# Patient Record
Sex: MALE | Race: BLACK OR AFRICAN AMERICAN
[De-identification: names, ages, dates, MRNs, and addresses within clinical notes are randomized per-mention and may not be internally consistent; named-entity substitution may affect disease eponyms.]

---

## 2017-04-04 ENCOUNTER — HOSPITAL ENCOUNTER (EMERGENCY)
Dept: HOSPITAL 17 - NETRI | Age: 67
Discharge: HOME | End: 2017-04-04
Payer: MEDICARE

## 2017-04-04 VITALS
SYSTOLIC BLOOD PRESSURE: 140 MMHG | RESPIRATION RATE: 12 BRPM | HEART RATE: 105 BPM | DIASTOLIC BLOOD PRESSURE: 79 MMHG | OXYGEN SATURATION: 98 % | TEMPERATURE: 98.4 F

## 2017-04-04 VITALS — BODY MASS INDEX: 28.63 KG/M2 | HEIGHT: 73 IN | WEIGHT: 216.05 LBS

## 2017-04-04 DIAGNOSIS — M25.561: Primary | ICD-10-CM

## 2017-04-04 PROCEDURE — 99283 EMERGENCY DEPT VISIT LOW MDM: CPT

## 2017-04-04 PROCEDURE — E0113 CRUTCH UNDERARM EACH WOOD: HCPCS

## 2017-04-04 PROCEDURE — L1830 KO IMMOB CANVAS LONG PRE OTS: HCPCS

## 2017-04-04 NOTE — PD
HPI


Chief Complaint:  Injury


Time Seen by Provider:  13:46


Travel History


International Travel<30 days:  No


Contact w/Intl Traveler<30days:  No


Traveled to known affect area:  No





History of Present Illness


HPI


66-year-old male presents emergency Department with complaint of right knee 

pain.  Has history of chronic right knee pain and says he needs a total knee 

replacement.  Pain is exacerbated on Sunday.  He denies injury or strain.  Dr. Ramirez is his orthopedic surgeon and he has been trying to set up surgery for 

the last year.  Denies paresthesias, loss of sensation, decreased range of 

motion, decreased strength to the affected extremity.  These came for support 

with ambulation.  Pain is worse with ambulation, bending the knee.  Has tried 

taking tramadol with no relief of pain.  Denies fever, chills, nausea, 

vomiting.  Dr. White is primary care provider.  No known allergies.  Denies 

significant past medical history.  No other modifying factors or associated 

signs and symptoms.





PFSH


Social History


Tobacco Use:  No





Allergies-Medications


(Allergen,Severity, Reaction):  


Coded Allergies:  


     No Known Allergies (Unverified , 4/4/17)


Reported Meds & Prescriptions





Reported Meds & Active Scripts


Active


Folding Walker/5" Wheels (Device) 1 Mis Mis 1 Ea .ROUTE AS DIRECTED


Wheelchair (Device) 1 Mis Mis 1 Ea .ROUTE AS DIRECTED


Naproxen 500 Mg Tab 500 Mg PO BID 7 Days


Deltasone (Prednisone) 20 Mg Tab 40 Mg PO DAILY 5 Days


Reported


Omeprazole 20 Mg Tab 20 Mg PO DAILY


Mobic (Meloxicam) 7.5 Mg Tab 7.5 Mg PO DAILY PRN








Review of Systems


Except as stated in HPI:  all other systems reviewed are Neg





Physical Exam


Narrative


GENERAL: Well-nourished, well-developed  male patient, in no 

acute distress; afebrile, nontoxic-appearing


SKIN: Warm and dry. 


HEAD: Atraumatic. Normocephalic. 


EYES: Pupils equal and round. No scleral icterus. No injection or drainage.


ENT: Mucosa pink and moist.  Airway patent.  


NECK: Trachea midline.  


CARDIOVASCULAR: Regular rate.


RESPIRATORY: No accessory muscle use. 


GASTROINTESTINAL: Flat.


MUSCULOSKELETAL: Right knee is nonedematous, nonerythematous, and without 

ecchymosis; with full range of motion to flexion to 90; joint is stable with 

negative drawer test; no tenderness elicited on palpation to the anterior, 

medial, lateral aspect.  Right lower extremity is supple and non-tense with 2+ 

pedal pulses and sensory intact and without erythema or edema.  No obvious 

deformity.


NEUROLOGICAL: Awake and alert.  Oriented 3.  No obvious cranial nerve 

deficits.  Motor grossly within normal limits. Normal speech. 


PSYCHIATRIC: Appropriate mood and affect; insight and judgment normal.





Data


Data


Last Documented VS





Vital Signs








  Date Time  Temp Pulse Resp B/P Pulse Ox O2 Delivery O2 Flow Rate FiO2


 


4/4/17 12:46 98.4 105 12 140/79 98   








Orders








 Crutches (4/4/17 13:46)











MDM


Medical Decision Making


Medical Screen Exam Complete:  Yes


Emergency Medical Condition:  Yes


Medical Record Reviewed:  Yes


Differential Diagnosis


Arthritis, bursitis, knee pain


Narrative Course


66-year-old male with chronic knee pain with acute exacerbation since Sunday.  

Denies injury, fall, strain.  Right lower extremity supple and nontender 2+ 

pedal pulses and sensory intact without erythema or edema.  Heart rate recheck 

on physical exam at approximately 90 bpm.  Patient says he sees Dr. Ramirez and 

is supposed to be having a right total knee replacement.  He has history of 

left total knee replacement.  I do not suspect fracture, dislocation and feel 

imaging is not necessary at this time.  I did offer to do an x-ray and the 

patient declined at this time.  The patient has a cane for support.  He is 

requesting one crutch for support.  Crutches provided for support.  Naproxen, 

Deltasone, a wheelchair, and walker prescribed for home.  Instructed patient to 

follow up with Dr. Ramirez.  Patient verbalizes understanding and agreement with 

treatment plan.  Patient is medically cleared and stable for discharge.  

Discussed reasons to return to the emergency department.  Instructed patient to 

follow up with primary care provider.  Patient agrees with treatment plan.  The 

patients vital signs are stable and the patient is stable for outpatient follow-

up and treatment.  Patient discharged home, stable and in no acute distress.





Diagnosis





 Primary Impression:  


 Right knee pain


 Qualified Code:  M25.561 - Right knee pain, unspecified chronicity


Referrals:  


Orthopaedic Surgeon





Primary Care Physician


Patient Instructions:  Crutch Instructions (ED), General Instructions, Knee 

Pain (ED)





***Additional Instructions:


Tylenol or ibuprofen as needed and as directed to reduce pain and inflammation


Rest, ice, compress, and elevate extremity to decrease pain and inflammation


Knee Brace for support


Crutches, walker, cane, wheelchair for support


Avoid aggravating activity; increase activity as tolerated


Follow-up with primary care provider


Return to the emergency department immediately with worsening symptoms


***Med/Other Pt SpecificInfo:  Prescription(s) given


Scripts


Folding Walker/5" Wheels 1 Mis Mis #1 Ea .route As Directed 


   Prov:Mayuri Shukla         4/4/17 


Wheelchair 1 Mis Mis #1 EA .ROUTE AS DIRECTED   Ref 0


   Prov:Mayuri Shukla         4/4/17 


Naproxen 500 Mg Yoj911 Mg PO BID  7 Days  Ref 0


   Prov:Mayuri Shukla         4/4/17 


Prednisone (Deltasone)20 Mg Tab40 Mg PO DAILY  5 Days  Ref 0


   Prov:Mayuri Shukla         4/4/17


Disposition:  01 DISCHARGE HOME


Condition:  Stable








Mayuri Shukla Apr 4, 2017 13:50

## 2017-04-04 NOTE — PD
Physical Exam


Date Seen by Provider:  Apr 4, 2017


Time Seen by Provider:  12:58


Narrative


66 YOBM C/O  CHRONIC R KNEE PAIN NO INJURY





VITALS REVIEWED WAITING FOR BED PLACEMENT





Data


Data


Last Documented VS





Vital Signs








  Date Time  Temp Pulse Resp B/P Pulse Ox O2 Delivery O2 Flow Rate FiO2


 


4/4/17 12:46 98.4 105 12 140/79 98   











MDM


Medical Record Reviewed:  Yes


Supervised Visit with EVE:  Yes








Juan José Ingram Apr 4, 2017 12:59

## 2017-09-03 ENCOUNTER — HOSPITAL ENCOUNTER (OUTPATIENT)
Dept: HOSPITAL 17 - NEPE | Age: 67
Setting detail: OBSERVATION
Discharge: LEFT BEFORE DISCHARGE | End: 2017-09-03
Attending: FAMILY MEDICINE | Admitting: FAMILY MEDICINE
Payer: MEDICARE

## 2017-09-03 VITALS
SYSTOLIC BLOOD PRESSURE: 144 MMHG | HEART RATE: 92 BPM | RESPIRATION RATE: 18 BRPM | OXYGEN SATURATION: 99 % | DIASTOLIC BLOOD PRESSURE: 94 MMHG

## 2017-09-03 VITALS — HEART RATE: 92 BPM | SYSTOLIC BLOOD PRESSURE: 133 MMHG | DIASTOLIC BLOOD PRESSURE: 77 MMHG | OXYGEN SATURATION: 100 %

## 2017-09-03 VITALS — HEART RATE: 94 BPM | OXYGEN SATURATION: 99 %

## 2017-09-03 VITALS
HEART RATE: 98 BPM | RESPIRATION RATE: 16 BRPM | OXYGEN SATURATION: 100 % | DIASTOLIC BLOOD PRESSURE: 97 MMHG | TEMPERATURE: 98.8 F | SYSTOLIC BLOOD PRESSURE: 171 MMHG

## 2017-09-03 DIAGNOSIS — L03.116: Primary | ICD-10-CM

## 2017-09-03 DIAGNOSIS — Z96.652: ICD-10-CM

## 2017-09-03 LAB
ALP SERPL-CCNC: 87 U/L (ref 45–117)
ALT SERPL-CCNC: 24 U/L (ref 12–78)
ANION GAP SERPL CALC-SCNC: 9 MEQ/L (ref 5–15)
AST SERPL-CCNC: 18 U/L (ref 15–37)
BASOPHILS # BLD AUTO: 0.1 TH/MM3 (ref 0–0.2)
BASOPHILS NFR BLD: 1.3 % (ref 0–2)
BILIRUB SERPL-MCNC: 0.4 MG/DL (ref 0.2–1)
BUN SERPL-MCNC: 13 MG/DL (ref 7–18)
CHLORIDE SERPL-SCNC: 104 MEQ/L (ref 98–107)
EOSINOPHIL # BLD: 0.1 TH/MM3 (ref 0–0.4)
EOSINOPHIL NFR BLD: 2.6 % (ref 0–4)
ERYTHROCYTE [DISTWIDTH] IN BLOOD BY AUTOMATED COUNT: 14.7 % (ref 11.6–17.2)
GFR SERPLBLD BASED ON 1.73 SQ M-ARVRAT: 60 ML/MIN (ref 89–?)
HCO3 BLD-SCNC: 28 MEQ/L (ref 21–32)
HCT VFR BLD CALC: 41.1 % (ref 39–51)
HEMO FLAGS: (no result)
LYMPHOCYTES # BLD AUTO: 1.7 TH/MM3 (ref 1–4.8)
LYMPHOCYTES NFR BLD AUTO: 29.9 % (ref 9–44)
MCH RBC QN AUTO: 26.1 PG (ref 27–34)
MCHC RBC AUTO-ENTMCNC: 33.6 % (ref 32–36)
MCV RBC AUTO: 77.4 FL (ref 80–100)
MONOCYTES NFR BLD: 13.1 % (ref 0–8)
NEUTROPHILS # BLD AUTO: 3.1 TH/MM3 (ref 1.8–7.7)
NEUTROPHILS NFR BLD AUTO: 53.1 % (ref 16–70)
PLATELET # BLD: 247 TH/MM3 (ref 150–450)
POTASSIUM SERPL-SCNC: 3.7 MEQ/L (ref 3.5–5.1)
RBC # BLD AUTO: 5.31 MIL/MM3 (ref 4.5–5.9)
SODIUM SERPL-SCNC: 141 MEQ/L (ref 136–145)
WBC # BLD AUTO: 5.8 TH/MM3 (ref 4–11)

## 2017-09-03 PROCEDURE — 96374 THER/PROPH/DIAG INJ IV PUSH: CPT

## 2017-09-03 PROCEDURE — 87070 CULTURE OTHR SPECIMN AEROBIC: CPT

## 2017-09-03 PROCEDURE — 99285 EMERGENCY DEPT VISIT HI MDM: CPT

## 2017-09-03 PROCEDURE — 87040 BLOOD CULTURE FOR BACTERIA: CPT

## 2017-09-03 PROCEDURE — 96365 THER/PROPH/DIAG IV INF INIT: CPT

## 2017-09-03 PROCEDURE — G0378 HOSPITAL OBSERVATION PER HR: HCPCS

## 2017-09-03 PROCEDURE — 85025 COMPLETE CBC W/AUTO DIFF WBC: CPT

## 2017-09-03 PROCEDURE — 96366 THER/PROPH/DIAG IV INF ADDON: CPT

## 2017-09-03 PROCEDURE — 83690 ASSAY OF LIPASE: CPT

## 2017-09-03 PROCEDURE — 96372 THER/PROPH/DIAG INJ SC/IM: CPT

## 2017-09-03 PROCEDURE — 93971 EXTREMITY STUDY: CPT

## 2017-09-03 PROCEDURE — 96375 TX/PRO/DX INJ NEW DRUG ADDON: CPT

## 2017-09-03 PROCEDURE — 96376 TX/PRO/DX INJ SAME DRUG ADON: CPT

## 2017-09-03 PROCEDURE — 86403 PARTICLE AGGLUT ANTBDY SCRN: CPT

## 2017-09-03 PROCEDURE — 80053 COMPREHEN METABOLIC PANEL: CPT

## 2017-09-03 NOTE — PD.AMA
Against Medical Advice Note


Discharge Disposition:  Against Medical Advice


Pt Condition on Discharge:  Stable


AMA Statement


Patient Jaylen Salcido has decided to leave the hospital against medical 

advice. This patient has the capacity to refuse care and understands the risks 

of leaving, including permanent disability and/or death, and has had an 

opportunity to ask questions about his condition. The patient has been informed 

that he may return for care at any time, and follow up has been arranged/

advised.





Applies only to today 9/3/17: Pt stated that he has to leave the hospital to go 

take care of his dogs. He will return to the [facility] as soon as he arranges 

care for his dogs. We agreed to accept him back as a direct admit only if he 

returns to the hospital today 9/3/2017








Applies after 9/3/2017: The Family Resident team will accept this patient on 

resident admitting days in the future.











Chapis Mane MD R2 Sep 3, 2017 14:04

## 2017-09-03 NOTE — PD
HPI


Chief Complaint:  Skin Problem


Time Seen by Provider:  07:54


Travel History


International Travel<30 days:  No


Contact w/Intl Traveler<30days:  No


Traveled to known affect area:  No





History of Present Illness


HPI


HAS BE[facility] FO[facility] WITH [facility] BUT WOUND GETTING WORSE, HE HAS NOTED ALSO LLE 

SWELLING WORSENING OVER PAST 2 WEEKS AS WELL, SOME DRAINAGE FROM WOUND





PFSH


Past Medical History


Medical History:  Denies Significant Hx





Past Surgical History


Joint Replacement:  Yes (lt total knee)





Social History


Alcohol Use:  Yes (beer occu)


Tobacco Use:  No (quit 7 yrs ago)





Allergies-Medications


(Allergen,Severity, Reaction):  


Coded Allergies:  


     No Known Allergies (Unverified , 4/4/17)


Reported Meds & Prescriptions





Reported Meds & Active Scripts


Active


Folding Walker/5" Wheels (Device) 1 Mis Mis 1 Ea .ROUTE AS DIRECTED


Wheelchair (Device) 1 Mis Mis 1 Ea .ROUTE AS DIRECTED


Reported


Omeprazole 20 Mg Tab 20 Mg PO DAILY








Review of Systems


Except as stated in HPI:  all other systems reviewed are Neg


Skin:  Positive Lesions





Physical Exam


Narrative


GENERAL: 


SKIN: Warm and dry.


HEAD: Atraumatic. Normocephalic. 


EYES: Pupils equal and round. No scleral icterus. No injection or drainage. 


ENT: No nasal bleeding or discharge.  Mucous membranes pink and moist.


NECK: Trachea midline. No JVD. 


CARDIOVASCULAR: Regular rate and rhythm.  


RESPIRATORY: No accessory muscle use. Clear to auscultation. Breath sounds 

equal bilaterally. 


GASTROINTESTINAL: Abdomen soft, non-tender, nondistended. Hepatic and splenic 

margins not palpable. 


MUSCULOSKELETAL: Extremities without clubbing, cyanosis, or edema. No obvious 

deformities. ....LEFT LE EDEMA 2+ NOT PRESENT ON RLE, LLE ALSO HAS AN 8CM 

PRESSURE ULCER TYPE OF WOUND W/O DRAINAGE, WARM TO TOUCH, 


NEUROLOGICAL: Awake and alert. No obvious cranial nerve deficits.  Motor 

grossly within normal limits. Five out of 5 muscle strength in the arms and 

legs.  Normal speech.


PSYCHIATRIC: Appropriate mood and affect; insight and judgment normal.





Data


Data


Last Documented VS





Vital Signs








  Date Time  Temp Pulse Resp B/P (MAP) Pulse Ox O2 Delivery O2 Flow Rate FiO2


 


9/3/17 09:37   18     


 


9/3/17 08:10  94   99 Room Air  


 


9/3/17 07:44 98.8       








Orders





 Orders


Complete Blood Count With Diff (9/3/17 07:59)


Comprehensive Metabolic Panel (9/3/17 07:59)


Blood Culture (9/3/17 07:59)


Lipase (9/3/17 07:59)


Wound Culture And Gram Stain (9/3/17 07:59)


Iv Access Insert/Monitor (9/3/17 07:59)


Ecg Monitoring (9/3/17 07:59)


Oximetry (9/3/17 07:59)


Us Leg Venous Doppler (9/3/17 07:59)


Piperacil-Tazo 4.5 Gm Premix (Zosyn 4.5 (9/3/17 07:59)


Vancomycin Inj (Vancomycin Inj) (9/3/17 07:59)


Morphine Inj (Morphine Inj) (9/3/17 09:15)


Admit Order (Ed Use Only) (9/3/17 12:00)





Labs





Laboratory Tests








Test


  9/3/17


08:05


 


White Blood Count 5.8 TH/MM3 


 


Red Blood Count 5.31 MIL/MM3 


 


Hemoglobin 13.8 GM/DL 


 


Hematocrit 41.1 % 


 


Mean Corpuscular Volume 77.4 FL 


 


Mean Corpuscular Hemoglobin 26.1 PG 


 


Mean Corpuscular Hemoglobin


Concent 33.6 % 


 


 


Red Cell Distribution Width 14.7 % 


 


Platelet Count 247 TH/MM3 


 


Mean Platelet Volume 8.3 FL 


 


Neutrophils (%) (Auto) 53.1 % 


 


Lymphocytes (%) (Auto) 29.9 % 


 


Monocytes (%) (Auto) 13.1 % 


 


Eosinophils (%) (Auto) 2.6 % 


 


Basophils (%) (Auto) 1.3 % 


 


Neutrophils # (Auto) 3.1 TH/MM3 


 


Lymphocytes # (Auto) 1.7 TH/MM3 


 


Monocytes # (Auto) 0.8 TH/MM3 


 


Eosinophils # (Auto) 0.1 TH/MM3 


 


Basophils # (Auto) 0.1 TH/MM3 


 


CBC Comment DIFF FINAL 


 


Differential Comment  


 


Blood Urea Nitrogen 13 MG/DL 


 


Creatinine 1.43 MG/DL 


 


Random Glucose 100 MG/DL 


 


Total Protein 8.1 GM/DL 


 


Albumin 3.6 GM/DL 


 


Calcium Level 9.1 MG/DL 


 


Alkaline Phosphatase 87 U/L 


 


Aspartate Amino Transf


(AST/SGOT) 18 U/L 


 


 


Alanine Aminotransferase


(ALT/SGPT) 24 U/L 


 


 


Total Bilirubin 0.4 MG/DL 


 


Sodium Level 141 MEQ/L 


 


Potassium Level 3.7 MEQ/L 


 


Chloride Level 104 MEQ/L 


 


Carbon Dioxide Level 28.0 MEQ/L 


 


Anion Gap 9 MEQ/L 


 


Estimat Glomerular Filtration


Rate 60 ML/MIN 


 


 


Lipase 164 U/L 











MDM


Medical Decision Making


Medical Screen Exam Complete:  Yes


Emergency Medical Condition:  Yes


Medical Record Reviewed:  Yes


Differential Diagnosis


CELLULITIS V DVT V PERIPHERAL EDEMA UNILATERAL V BAKERS CYST


Narrative Course


ULTRS DID NOT REVEAL DVT, LEG FINDINGS TROUBLESOME FOR CELLULITIS, THIS WAS 

EXPLAINED TO PATIENT IN DETAIL, INCLUDING RISK OF OSTEOMYELITIS AS WELL AS 

UNTREATED CELLULITIS WHICH CAN LEAD TO AMPUTATION.  PATIENT UNDERSTOOD AND 

ORIGINALLY AGREED TO ADMISSION FOR OBS STATUS.  [facility] CALLED TO ADMIT





Diagnosis





 Primary Impression:  


 LLE CELLULITIS





Admitting Information


Admitting Physician Requests:  Observation











Koko Roberts MD Sep 3, 2017 08:41

## 2017-09-03 NOTE — RADRPT
EXAM DATE/TIME:  09/03/2017 08:58 

 

[facility] COMPARISON:     

No previous studies available for comparison.

        

 

 

INDICATIONS :                

Left leg pain. 

            

 

MEDICAL HISTORY :           

Alcohol use. Tobacco use. 

 

SURGICAL HISTORY :         

Left knee replacement. 

 

ENCOUNTER:     

Initial

 

ACUITY:     

1 month

 

PAIN SCORE:      

2/10

 

LOCATION:      

Left  leg. 

            

                      

 

TECHNIQUE:     

Venous ultrasound of the leg was performed from the inguinal ligament to the proximal calf.  Real-sebastien
e, color Doppler and spectral tracing, compression and augmentation techniques were used.  

 

FINDINGS:     

There is normal compressibility of the deep venous system from the inguinal region to the proximal ca
lf.  No echogenic clot is seen in the lumen of the common femoral, femoral, popliteal, and posterior 
tibial veins.  There is a normal response of the venous system to proximal and distal augmentation an
d respiration.  

 

CONCLUSION:     

Normal examination.  

 

 

 

 Ra Neville MD on September 03, 2017 at 9:23           

Board Certified Radiologist.

 This report was verified electronically.

## 2017-09-03 NOTE — HHI.HP
[facility]


Service


Family Medicine


Primary Care Physician


[facility]


Admission Diagnosis





LLE CELLULITIS


Diagnoses:  


International Travel<30 Days:  No


Contact w/Intl Traveler<30days:  No


Known Affected Area:  No





Past Family Social History


Allergies:  


Coded Allergies:  


     No Known Allergies (Unverified , 17)





Physical Exam


Vital Signs





Vital Signs








  Date Time  Temp Pulse Resp B/P (MAP) Pulse Ox O2 Delivery O2 Flow Rate FiO2


 


9/3/17 12:08  92 18 144/94 (111) 99 Room Air  


 


9/3/17 09:37   18     


 


9/3/17 08:10  94   99 Room Air  


 


9/3/17 07:53  92  133/77 (95) 100 Room Air  


 


9/3/17 07:44 98.8 98 16 171/97 (121) 100   








Physical Exam


GENERAL: This is a well-nourished, well-developed patient, in no apparent 

distress.


SKIN: No rashes, ecchymoses or lesions. Cool and dry.


HEAD: Atraumatic. Normocephalic. No temporal or scalp tenderness.


EYES: Pupils equal round and reactive. Extraocular motions intact. No scleral 

icterus. No injection or drainage. 


ENT: Nose without bleeding, purulent drainage or septal hematoma. Throat 

without erythema, tonsillar hypertrophy or exudate. Uvula midline. Airway 

patent.


NECK: Trachea midline. No JVD or lymphadenopathy. Supple, nontender, no 

meningeal signs.


CARDIOVASCULAR: Regular rate and rhythm without murmurs, gallops, or rubs. 


RESPIRATORY: Clear to auscultation. Breath sounds equal bilaterally. No wheezes

, rales, or rhonchi.  


GASTROINTESTINAL: Abdomen soft, non-tender, nondistended. No hepato-splenomegaly

, or palpable masses. No guarding.


MUSCULOSKELETAL: Extremities without clubbing, cyanosis, or edema. No joint 

tenderness, effusion, or edema noted. No calf tenderness. Negative Homans sign 

bilaterally.


NEUROLOGICAL: Awake and alert. Cranial nerves II through XII intact.  Motor and 

sensory grossly within normal limits. Five out of 5 muscle strength in all 

muscle groups.  Normal speech.


Laboratory





Laboratory Tests








Test


  9/3/17


08:05


 


White Blood Count 5.8 


 


Red Blood Count 5.31 


 


Hemoglobin 13.8 


 


Hematocrit 41.1 


 


Mean Corpuscular Volume 77.4 


 


Mean Corpuscular Hemoglobin 26.1 


 


Mean Corpuscular Hemoglobin


Concent 33.6 


 


 


Red Cell Distribution Width 14.7 


 


Platelet Count 247 


 


Mean Platelet Volume 8.3 


 


Neutrophils (%) (Auto) 53.1 


 


Lymphocytes (%) (Auto) 29.9 


 


Monocytes (%) (Auto) 13.1 


 


Eosinophils (%) (Auto) 2.6 


 


Basophils (%) (Auto) 1.3 


 


Neutrophils # (Auto) 3.1 


 


Lymphocytes # (Auto) 1.7 


 


Monocytes # (Auto) 0.8 


 


Eosinophils # (Auto) 0.1 


 


Basophils # (Auto) 0.1 


 


CBC Comment DIFF FINAL 


 


Differential Comment  


 


Blood Urea Nitrogen 13 


 


Creatinine 1.43 


 


Random Glucose 100 


 


Total Protein 8.1 


 


Albumin 3.6 


 


Calcium Level 9.1 


 


Alkaline Phosphatase 87 


 


Aspartate Amino Transf


(AST/SGOT) 18 


 


 


Alanine Aminotransferase


(ALT/SGPT) 24 


 


 


Total Bilirubin 0.4 


 


Sodium Level 141 


 


Potassium Level 3.7 


 


Chloride Level 104 


 


Carbon Dioxide Level 28.0 


 


Anion Gap 9 


 


Estimat Glomerular Filtration


Rate 60 


 


 


Lipase 164 














 Date/Time


Source Procedure


Growth Status


 


 


 9/3/17 08:40


Blood Peripheral Aerobic Blood Culture


Pending Received


 


 9/3/17 08:40


Blood Peripheral Anaerobic Blood Culture


Pending Received





 9/3/17 08:20


Wound Leg Gram Stain


Pending Received


 


 9/3/17 08:20


Wound Leg Wound Culture


Pending Received








Result Diagram:  


9/3/17 0805                                                                    

            9/3/17 0805








Caprini VTE Risk Assessment


Caprini Risk Assessment Model











 Point Value = 1          Point Value = 2  Point Value = 3  Point Value = 5


 


Age 41-60


Minor surgery


BMI > 25 kg/m2


Swollen legs


Varicose veins


Pregnancy or postpartum


History of unexplained or recurrent


   spontaneous 


Oral contraceptives or hormone


   replacement


Sepsis (< 1 month)


Serious lung disease, including


   pneumonia (< 1 month)


Abnormal pulmonary function


Acute myocardial infarction


Congestive heart failure (< 1 month)


History of inflammatory bowel disease


Medical patient at bed rest Age 61-74


Arthroscopic surgery


Major open surgery (> 45 min)


Laparoscopic surgery (> 45 min)


Malignancy


Confined to bed (> 72 hours)


Immobilizing plaster cast


Central venous access Age >= 75


History of VTE


Family history of VTE


Factor V Leiden


Prothrombin 49019R


Lupus anticoagulant


Anticardiolipin antibodies


Elevated serum homocysteine


Heparin-induced thrombocytopenia


Other congenital or acquired


   thrombophilia Stroke (< 1 month)


Elective arthroplasty


Hip, pelvis, or leg fracture


Acute spinal cord injury (< 1 month)








Prophylaxis Regimen











   Total Risk


Factor Score Risk Level Prophylaxis Regimen


 


0-1      Low Early ambulation


 


2 Moderate Order ONE of the following:


*Sequential Compression Device (SCD)


*Heparin 5000 units SQ BID


 


3-4 Higher Order ONE of the following medications:


*Heparin 5000 units SQ TID


*Enoxaparin/Lovenox 40 mg SQ daily (WT < 150 kg, CrCl > 30 mL/min)


*Enoxaparin/Lovenox 30 mg SQ daily (WT < 150 kg, CrCl > 10-29 mL/min)


*Enoxaparin/Lovenox 30 mg SQ BID (WT < 150 kg, CrCl > 30 mL/min)


AND/OR


*Sequential Compression Device (SCD)


 


5 or more Highest Order ONE of the following medications:


*Heparin 5000 units SQ TID (Preferred with Epidurals)


*Enoxaparin/Lovenox 40 mg SQ daily (WT < 150 kg, CrCl > 30 mL/min)


*Enoxaparin/Lovenox 30 mg SQ daily (WT < 150 kg, CrCl > 10-29 mL/min)


*Enoxaparin/Lovenox 30 mg SQ BID (WT < 150 kg, CrCl > 30 mL/min)


AND


*Sequential Compression Device (SCD)

















Chapis Mane MD R2 Sep 3, 2017 12:14

## 2018-03-06 ENCOUNTER — HOSPITAL ENCOUNTER (EMERGENCY)
Dept: HOSPITAL 17 - NEPK | Age: 68
Discharge: HOME | End: 2018-03-06
Payer: SELF-PAY

## 2018-03-06 VITALS — HEIGHT: 73 IN | WEIGHT: 216.05 LBS | BODY MASS INDEX: 28.63 KG/M2

## 2018-03-06 VITALS
OXYGEN SATURATION: 100 % | RESPIRATION RATE: 17 BRPM | TEMPERATURE: 98.3 F | HEART RATE: 97 BPM | DIASTOLIC BLOOD PRESSURE: 72 MMHG | SYSTOLIC BLOOD PRESSURE: 146 MMHG

## 2018-03-06 DIAGNOSIS — M79.605: Primary | ICD-10-CM

## 2018-03-06 PROCEDURE — 99283 EMERGENCY DEPT VISIT LOW MDM: CPT

## 2018-03-06 NOTE — PD
HPI


Chief Complaint:  Pain: Acute or Chronic


Time Seen by Provider:  09:08


Travel History


International Travel<30 days:  No


Contact w/Intl Traveler<30days:  No


Traveled to known affect area:  No





History of Present Illness


HPI


Patient comes to the emergency department complaining of left leg pain has been 

ongoing since having knee surgery in 2014.  Patient reports that he has been 

taking medication prescribed by his pain management doctor that seems to help 

some.  Pain is worse with palpation and walking.  Patient reports he got 

concerned because yesterday he had his leg wrapped and when he unwrapped it the 

dressing had a foul smelling discharge to it.  Patient reports he contacted his 

primary care doctor today and was told to come to the emergency department.  

Patient denies any fevers or new injury.  Patient reports he has been compliant 

with everything he has been told to do with this.  Describes pain as a burning 

throbbing pain throughout his left lower leg without radiation.





PFSH


Past Medical History


Blood Disorders:  No


Cancer:  No


Cardiovascular Problems:  No


Chemotherapy:  No


Endocrine:  No


Gastrointestinal Disorders:  No


Genitourinary:  No


Immune Disorder:  No


Implanted Vascular Access Dvce:  No


Musculoskeletal:  Yes (left knee)


Neurologic:  No


Psychiatric:  No


Reproductive:  No


Respiratory:  No


Radiation Therapy:  No





Past Surgical History


Joint Replacement:  Yes (lt total knee)


Other Surgery:  Yes (Total knee, left)





Social History


Alcohol Use:  Yes (beer occu)


Tobacco Use:  No (quit 7 yrs ago)


Substance Use:  No





Allergies-Medications


(Allergen,Severity, Reaction):  


Coded Allergies:  


     No Known Allergies (Unverified  Adverse Reaction, Unknown, 3/6/18)


Reported Meds & Prescriptions





Reported Meds & Active Scripts


Active


Clindamycin (Clindamycin HCl) 300 Mg Cap 300 Mg PO Q6H 10 Days


Keflex (Cephalexin) 500 Mg Cap 500 Mg PO Q12H 10 Days


Lisinopril 5 Mg Tab 5 Mg PO DAILY


Hydrocodone-Acetaminophen 5-325 mg Tab 1 Tab PO Q6H PRN 10 Days


Ernestine-Colace (Sennosides-Docusate Sodium) 8.6-50 Mg Tab 2 Tab PO BID PRN


Medical Legwear Elastic Knee High 1 Mis Mis Ea TOPICAL AS DIRECTED


Medical Compression Elastic Stockings 1 Mis Mis Ea .ROUTE AS DIRECTED


Aspirin 81 Mg Chew 81 Mg CHEW DAILY


Omeprazole 20 Mg Tab 20 Mg PO DAILY


Folding Walker/5" Wheels (Device) 1 Mis Mis 1 Ea .ROUTE AS DIRECTED


Wheelchair (Device) 1 Mis Mis 1 Ea .ROUTE AS DIRECTED








Review of Systems


Except as stated in HPI:  all other systems reviewed are Neg





Physical Exam


Narrative


GENERAL: Well-developed, overly nourished, in no acute distress, and non-ill 

appearing.


SKIN: Dry flaking skin noted left lower extremity.  It is afebrile 

nonerythematous, without crepitus, and without drainage.


HEAD: Atraumatic. Normocephalic. 


EYES: Pupils equal and round. EOMI. No scleral icterus. No injection or 

drainage. 


ENT: No nasal bleeding or discharge.  Mucous membranes pink and moist.


NECK: Trachea midline. Supple.  No nuclear rigidity.


CARDIOVASCULAR: Dorsal pulses 2+ intact and equal bilaterally.  Capillary 

refill is equal bilateral lower extremities.


RESPIRATORY: No accessory muscle use.  No respiratory distress. 


MUSCULOSKELETAL: No obvious deformities. No clubbing.  No cyanosis.  Trace 

edema bilateral lower extremities.  Full range of motion.


NEUROLOGICAL: Awake and alert. No obvious cranial nerve deficits.  Motor 

grossly within normal limits. Normal speech.


PSYCHIATRIC: Appropriate mood and affect; insight and judgment normal.





Data


Data


Last Documented VS





Vital Signs








  Date Time  Temp Pulse Resp B/P (MAP) Pulse Ox O2 Delivery O2 Flow Rate FiO2


 


3/6/18 08:13 98.3 97 17 146/72 (96) 100   








Orders





 Orders


Ed Discharge Order (3/6/18 09:35)








[facility] [place]


Medical Decision Making


Medical Screen Exam Complete:  Yes


Emergency Medical Condition:  Yes


Differential Diagnosis


Chronic leg pain, cellulitis, vasculitis, wound infection


Narrative Course


No obvious signs of cellulitis or other infectious process but will place 

patient on antibiotics prophylactically secondary to reported foul-smelling 

discharge and history of infected wound to the leg.  Patient in no obvious 

distress upon re-evaluation. Patient was asked if they wanted to speak to my 

attending, which the patient did not wish to do at this time.  Any questions/

concerns in reference to patient diagnosis/condition discussed and clarified 

prior to patient's discharge. Reinforced sheer importance of close follow up 

with patient's primary physician or primary care clinic. Instructed patient to 

return to ED immediately, if symptoms return/worsen. Patient showed 

understanding of above instructions.  Further instructions and recommendations 

were detailed in discharge paperwork.  Patient ambulated without difficulty out 

of ED at discharge.





Diagnosis





 Primary Impression:  


 Leg pain, left


Referrals:  


Ignacio Urbina MD


Patient Instructions:  General Instructions, Leg Pain (ED)





***Additional Instructions:  


Follow-up with your primary care physician and/or vascular surgeon in this week 

for reevaluation.  Take all medication as prescribed.  Return to the emergency 

department if symptoms get worse.


***Med/Other Pt SpecificInfo:  Prescription(s) given


Scripts


Clindamycin (Clindamycin) 300 Mg Cap


300 MG PO Q6H for Infection for 10 Days, #40 CAP 0 Refills


   Prov: Barbie Crowley MD         3/6/18 


Cephalexin (Keflex) 500 Mg Cap


500 MG PO Q12H for Infection for 10 Days, #20 CAP 0 Refills


   Prov: Barbie Crowley MD         3/6/18


Disposition:  01 DISCHARGE HOME


Condition:  Stable











Andrés Connor Mar 6, 2018 09:29